# Patient Record
Sex: FEMALE | Race: WHITE | NOT HISPANIC OR LATINO | Employment: OTHER
[De-identification: names, ages, dates, MRNs, and addresses within clinical notes are randomized per-mention and may not be internally consistent; named-entity substitution may affect disease eponyms.]

---

## 2017-05-25 ENCOUNTER — RECORDS - HEALTHEAST (OUTPATIENT)
Dept: ADMINISTRATIVE | Facility: OTHER | Age: 32
End: 2017-05-25

## 2018-11-26 ENCOUNTER — OFFICE VISIT - HEALTHEAST (OUTPATIENT)
Dept: MIDWIFE SERVICES | Facility: CLINIC | Age: 33
End: 2018-11-26

## 2018-11-26 DIAGNOSIS — O20.0 THREATENED MISCARRIAGE: ICD-10-CM

## 2018-11-26 DIAGNOSIS — N96 RECURRENT PREGNANCY LOSS: ICD-10-CM

## 2018-11-26 DIAGNOSIS — E66.812 CLASS 2 OBESITY WITHOUT SERIOUS COMORBIDITY WITH BODY MASS INDEX (BMI) OF 35.0 TO 35.9 IN ADULT, UNSPECIFIED OBESITY TYPE: ICD-10-CM

## 2018-11-26 RX ORDER — ALBUTEROL SULFATE 90 UG/1
2 AEROSOL, METERED RESPIRATORY (INHALATION)
Status: SHIPPED | COMMUNITY
Start: 2018-11-26

## 2018-11-26 RX ORDER — LEVOTHYROXINE SODIUM 13 UG/1
CAPSULE ORAL
Status: SHIPPED | COMMUNITY
Start: 2017-05-31

## 2018-11-26 ASSESSMENT — MIFFLIN-ST. JEOR: SCORE: 1731.68

## 2018-11-27 ENCOUNTER — COMMUNICATION - HEALTHEAST (OUTPATIENT)
Dept: MIDWIFE SERVICES | Facility: CLINIC | Age: 33
End: 2018-11-27

## 2018-11-27 ENCOUNTER — RECORDS - HEALTHEAST (OUTPATIENT)
Dept: ADMINISTRATIVE | Facility: OTHER | Age: 33
End: 2018-11-27

## 2018-11-27 ENCOUNTER — AMBULATORY - HEALTHEAST (OUTPATIENT)
Dept: OBGYN | Facility: CLINIC | Age: 33
End: 2018-11-27

## 2018-11-27 ENCOUNTER — AMBULATORY - HEALTHEAST (OUTPATIENT)
Dept: LAB | Facility: HOSPITAL | Age: 33
End: 2018-11-27

## 2018-11-27 ENCOUNTER — COMMUNICATION - HEALTHEAST (OUTPATIENT)
Dept: ADMINISTRATIVE | Facility: CLINIC | Age: 33
End: 2018-11-27

## 2018-11-27 DIAGNOSIS — N93.9 VAGINAL BLEEDING: ICD-10-CM

## 2018-11-27 DIAGNOSIS — E66.812 CLASS 2 OBESITY WITHOUT SERIOUS COMORBIDITY WITH BODY MASS INDEX (BMI) OF 35.0 TO 35.9 IN ADULT, UNSPECIFIED OBESITY TYPE: ICD-10-CM

## 2018-11-27 DIAGNOSIS — O20.0 THREATENED MISCARRIAGE: ICD-10-CM

## 2018-11-27 DIAGNOSIS — N96 RECURRENT PREGNANCY LOSS: ICD-10-CM

## 2018-11-27 LAB
ABO/RH(D): NORMAL
ABORH REPEAT: NORMAL
PROGEST SERPL-MCNC: 0.2 NG/ML
TSH SERPL DL<=0.005 MIU/L-ACNC: 1.87 UIU/ML (ref 0.3–5)

## 2018-11-28 ENCOUNTER — COMMUNICATION - HEALTHEAST (OUTPATIENT)
Dept: OBGYN | Facility: CLINIC | Age: 33
End: 2018-11-28

## 2018-11-28 LAB — HBA1C MFR BLD: 5 % (ref 4.2–6.1)

## 2018-11-29 LAB
CARDIOLIPIN IGG SER IA-ACNC: <1.6 GPL-U/ML (ref 0–19.9)
CARDIOLIPIN IGM SER IA-ACNC: <0.2 MPL-U/ML (ref 0–19.9)

## 2018-11-30 ENCOUNTER — COMMUNICATION - HEALTHEAST (OUTPATIENT)
Dept: ADMINISTRATIVE | Facility: CLINIC | Age: 33
End: 2018-11-30

## 2018-11-30 LAB — LA PPP-IMP: NEGATIVE

## 2018-12-11 ENCOUNTER — OFFICE VISIT - HEALTHEAST (OUTPATIENT)
Dept: OBGYN | Facility: CLINIC | Age: 33
End: 2018-12-11

## 2018-12-11 DIAGNOSIS — N96 HISTORY OF RECURRENT MISCARRIAGES: ICD-10-CM

## 2018-12-11 ASSESSMENT — MIFFLIN-ST. JEOR: SCORE: 1767.96

## 2018-12-17 ENCOUNTER — COMMUNICATION - HEALTHEAST (OUTPATIENT)
Dept: OBGYN | Facility: CLINIC | Age: 33
End: 2018-12-17

## 2018-12-19 ENCOUNTER — AMBULATORY - HEALTHEAST (OUTPATIENT)
Dept: LAB | Facility: CLINIC | Age: 33
End: 2018-12-19

## 2018-12-19 ENCOUNTER — COMMUNICATION - HEALTHEAST (OUTPATIENT)
Dept: SCHEDULING | Facility: CLINIC | Age: 33
End: 2018-12-19

## 2018-12-19 DIAGNOSIS — N96 HISTORY OF RECURRENT MISCARRIAGES: ICD-10-CM

## 2018-12-19 LAB
ESTRADIOL SERPL-MCNC: 20 PG/ML
FSH SERPL-ACNC: 3.9 MIU/ML

## 2018-12-24 ENCOUNTER — HOSPITAL ENCOUNTER (OUTPATIENT)
Dept: RADIOLOGY | Facility: HOSPITAL | Age: 33
Discharge: HOME | End: 2018-12-24
Attending: OBSTETRICS & GYNECOLOGY | Admitting: RADIOLOGY

## 2018-12-24 DIAGNOSIS — N96 HISTORY OF RECURRENT MISCARRIAGES: ICD-10-CM

## 2019-01-08 ENCOUNTER — COMMUNICATION - HEALTHEAST (OUTPATIENT)
Dept: OBGYN | Facility: CLINIC | Age: 34
End: 2019-01-08

## 2019-03-26 ENCOUNTER — AMBULATORY - HEALTHEAST (OUTPATIENT)
Dept: OBGYN | Facility: CLINIC | Age: 34
End: 2019-03-26

## 2019-03-26 ENCOUNTER — COMMUNICATION - HEALTHEAST (OUTPATIENT)
Dept: OBGYN | Facility: CLINIC | Age: 34
End: 2019-03-26

## 2019-03-26 DIAGNOSIS — N96 HISTORY OF RECURRENT MISCARRIAGES: ICD-10-CM

## 2019-03-27 ENCOUNTER — AMBULATORY - HEALTHEAST (OUTPATIENT)
Dept: OBGYN | Facility: CLINIC | Age: 34
End: 2019-03-27

## 2019-03-27 ENCOUNTER — AMBULATORY - HEALTHEAST (OUTPATIENT)
Dept: LAB | Facility: CLINIC | Age: 34
End: 2019-03-27

## 2019-03-27 DIAGNOSIS — N96 HISTORY OF RECURRENT MISCARRIAGES: ICD-10-CM

## 2019-03-27 LAB
HCG UR QL: NEGATIVE
PROGEST SERPL-MCNC: 6.9 NG/ML

## 2019-04-04 ENCOUNTER — AMBULATORY - HEALTHEAST (OUTPATIENT)
Dept: OBGYN | Facility: CLINIC | Age: 34
End: 2019-04-04

## 2019-04-04 DIAGNOSIS — N96 HISTORY OF RECURRENT MISCARRIAGES: ICD-10-CM

## 2019-07-18 ENCOUNTER — COMMUNICATION - HEALTHEAST (OUTPATIENT)
Dept: ADMINISTRATIVE | Facility: CLINIC | Age: 34
End: 2019-07-18

## 2019-11-18 ENCOUNTER — COMMUNICATION - HEALTHEAST (OUTPATIENT)
Dept: MIDWIFE SERVICES | Facility: CLINIC | Age: 34
End: 2019-11-18

## 2021-05-30 NOTE — TELEPHONE ENCOUNTER
Called Cary back with instructions to make her next appt with  either [place] for reproductive medicine or  Reproductive medicine. Numbers given.  Instructed to check with her insurance first then make an appt.  Call or email back so we can fax over her records. Cary states she understands instructions.

## 2021-05-30 NOTE — TELEPHONE ENCOUNTER
Name of caller: Patient  Phone number where you may be reached: [phone]  Reason for call: Pt took clomid for 4 months but it's not working and she would like to speak with Dr. Betts to discuss symptoms that she's having next steps.   Best time to call back: any  If we don't reach you, is it okay to leave a detailed message? yes

## 2021-06-02 VITALS — BODY MASS INDEX: 39.49 KG/M2 | WEIGHT: 237 LBS | HEIGHT: 65 IN

## 2021-06-02 VITALS — HEIGHT: 65 IN | WEIGHT: 229 LBS | BODY MASS INDEX: 38.15 KG/M2

## 2021-06-16 PROBLEM — D27.0 DERMOID CYST OF RIGHT OVARY: Status: ACTIVE | Noted: 2018-11-28

## 2021-06-16 PROBLEM — D25.9 UTERINE FIBROID: Status: ACTIVE | Noted: 2018-11-28

## 2021-06-16 PROBLEM — N96 RECURRENT PREGNANCY LOSS: Status: ACTIVE | Noted: 2018-11-28

## 2021-06-21 NOTE — PROGRESS NOTES
Cary Barrett  19  [id number]        Assessment:   1.  Recurrent pregnancy loss  2.  Threatened/incomplete   3.  Obesity  4.  S/P thyroid CA      Plan:  Discussed with patient that up to about 60% of early pregnancy losses due to chromosomal abnormalities, discussed that should another loss occur could consider retaining the products of conception for genetic testing.  Discussed that sometimes it is due to an anatomic reason and thus further ultrasound such as HSG may be recommended.  I recommend that she follows up with Dr. Sherrie Betts for consultation.  We also discussed some other factors that could lead to recurrent loss and I did offer some lab testing today including lupus anticoagulant and anti-cardiolipin IgG and IgM.  I also recommend a thyroid screen and hemoglobin A1c due to patient weight.  I did not recommend thrombophilia testing today, however I did encourage the patient to request from her mother the name of the possible thrombophilia or clotting disorder that she has.  Patient has less insurance coverage so she would like to talk to her insurance as well as patient cost through [facility] to discuss what cost of lab testing would be, so lab testing was ordered in future.  We did discuss that certain labs such as the anticoagulant and anticardiolipin should be retested, and I recommend that she follows up with Dr. Sherrie Betts for consultation and future testing.  Blood typing also recommended.  Ultrasound was ordered for tomorrow at 12:30 PM, this was not ordered as hold and call due to high suspicion of loss, would recommend beta hCG testing as needed based on ultrasound.        GEN Pepe, [facility]  Total time: 40 minutes     Subjective:  Cary Barrett presents today to discuss recurrent pregnancy loss and current miscarriage.  Patient shares that her cycles are every 25 days and last 1-5 days, menarche started at 16 years of age.  Patient  "states that he had a positive pregnancy test on 11/20 and began having light bleeding on Friday that increased to heavy bleeding on Saturday with severe cramping and believes that she passed tissue over the weekend.  Patient states that she is having moderate bleeding today, no cramping and no signs of infection.  Patient shares that she had a miscarriage in November 2016 as well as September 2017, never had ultrasounds to confirm these pregnancies.  Patient shares that she has positive blood type.     Patient has a history of thyroid cancer diagnosed at age 6 and she received radiation from 1990 2/2/2007, patient also has history of fibroids and had removal through embolization in 2017.  No history of PID or STIs.   Patient's partner has hypertension and is on various medications.  Patient reports minimal alcohol use for both herself and her partner, no tobacco use for herself or her partner, and no illicit drug use for herself or for her partner.    Reports minimal caffeine intake.  Patient shares that her mother has a clotting disorder that has led to anemia but she is unsure what this is, patient shows that she was told to avoid estrogen and contraception due to her mother's clotting history.  Patient's partner does not have other children.    Review of systems: Vaginal bleeding.    Objective:   /84 (Patient Site: Left Arm, Patient Position: Sitting, Cuff Size: Adult Regular)   Pulse 72   Ht 5' 4.5\" (1.638 m)   Wt (!) 229 lb (103.9 kg)   LMP 10/25/2018 (Exact Date)   Breastfeeding? No   BMI 38.70 kg/m    Well nourished, no acute distress.     Past Medical History:   Diagnosis Date     Allergic      Female infertility      Fibroid      History of transfusion      Kidney stone      Meningitis 2012     Migraine      Thyroid ca (H)     radiation from age [age]     Past Surgical History:   Procedure Laterality Date     RADICAL NECK DISSECTION       THYROIDECTOMY       UTERINE FIBROID EMBOLIZATION      2017 "       OB History      Para Term  AB Living    3       3      SAB TAB Ectopic Multiple Live Births    3                  Family History   Problem Relation Age of Onset     Cancer Mother      Clotting disorder Mother      Asthma Mother      Hypertension Father      Diabetes Father      No Medical Problems Sister

## 2021-06-21 NOTE — PROGRESS NOTES
Patient called and asked for a progesterone lab to be added to her blood draw from today.  Lab ordered as requested.

## 2021-06-22 NOTE — PROGRESS NOTES
CC: The patient is being seen as a consult from Sherrie Watkins CNM, secondary to recurrent miscarriages.    HPI: The pt is a 33 y.o. MWF  who presents with 3 miscarriages in the last 2 years.  The first was in Nov of 2016, then Sept of 2017, then Nov 2018.  With all of them she was about 5-6 pregnant.  Her mother had 4 miscarriages, and her maternal grandmother had 3.  Her mother also has a history of some kind of clotting disorder that made her anemic, and she was told to not use estrogens.  She doesn't think any further testing was done; she was told not to use estrogen containing medications as well, but she hasn't been tested herself.  She has a history of a uterine artery embolization in 2017, with 2 of the miscarriages happening after that.  She had an ultrasound 11/27/18 that showed two intramural fibroids, one 2.2 cm in the anterior fundus and one 2.2 cm in the posterior fundus.  The endometrium was 5 mm.  The left ovary appeared normal; the right had a 3.1 x 2.8 x 2.7 cm dermoid.  She had normal testing for TSH, lupus anticoagulant, and anti cardiolipin antibody.  Her progesterone level was low but was done shortly after the miscarriage.  She has not had a period since the most recent miscarriage.    Past Medical History:   Diagnosis Date     Allergic      Female infertility      Fibroid      History of transfusion      Kidney stone      Meningitis 2012     Migraine      Thyroid ca (H)     radiation from age [age]       Past Surgical History:   Procedure Laterality Date     RADICAL NECK DISSECTION  1993     THYROIDECTOMY  1992     UTERINE ARTERY EMBOLIZATION  2017    fibroids       Patient's   Family History   Problem Relation Age of Onset     Cancer Mother         cervical 2018, vaginal 2018, uterine 2015     Clotting disorder Mother      Asthma Mother      Hypertension Father      Diabetes Father      No Medical Problems Sister      Breast cancer Paternal Aunt      Breast cancer Paternal  "Grandmother      Cancer Paternal Grandmother         lymphoma     Diabetes Paternal Grandfather      Heart disease Paternal Grandfather        Patient   Social History     Socioeconomic History     Marital status:      Spouse name: None     Number of children: None     Years of education: None     Highest education level: None   Social Needs     Financial resource strain: None     Food insecurity - worry: None     Food insecurity - inability: None     Transportation needs - medical: None     Transportation needs - non-medical: None   Occupational History     None   Tobacco Use     Smoking status: Never Smoker     Smokeless tobacco: Never Used   Substance and Sexual Activity     Alcohol use: No     Frequency: Never     Drug use: No     Sexual activity: Yes     Partners: Male     Birth control/protection: None   Other Topics Concern     None   Social History Narrative     None       Outpatient Medications Prior to Visit   Medication Sig Dispense Refill     albuterol (PROVENTIL HFA) 90 mcg/actuation inhaler Inhale 2 puffs.       levothyroxine (TIROSINT) 150 mcg cap TAKE 1 CAPSULE (150 MCG) BY ORAL ROUTE ONCE DAILY       No facility-administered medications prior to visit.        Patient is allergic to cat hair standardized allergenic extract; hydrocodone-acetaminophen; ketorolac tromethamine; ondansetron; and oxycodone-acetaminophen.    ROS:  12 part ROS is negative aside from those symptoms in the HPI    PE:  /78   Pulse 88   Ht 5' 4.5\" (1.638 m)   Wt (!) 237 lb (107.5 kg)           Body mass index is 40.05 kg/m .    General: obese WF, NAD  Psych: normal mood  Neuro: CN I-XII grossly intact  MS: normal gait    Assessment: 33 y.o. MWF  with recurrent miscarriages.    Plan: Natural history of causes for miscarriages discussed with the patient.  We discussed that with the embolization I would recommend a hysterosalpingogram.  We also discussed progesterone testing in the luteal phase; we discussed " why the level looked so low with her miscarriage.  We discussed testing for clotting factors, especially with her family history.  We discussed that the clotting factors shouldn't be tested until she is at least 6 weeks out from the miscarriage.  We discussed ovulation testing so we can get an accurate progesterone.  We discussed day 3 testing for FSH and estradiol.  We also discussed that if all the testing is normal, I would recommend karyotypes for her and her .  Questions were answered.    Approximately 30 minutes were spent with the patient with the majority in counseling.

## 2021-06-26 ENCOUNTER — HEALTH MAINTENANCE LETTER (OUTPATIENT)
Age: 36
End: 2021-06-26

## 2021-09-09 DIAGNOSIS — G47.33 OSA (OBSTRUCTIVE SLEEP APNEA): Primary | ICD-10-CM

## 2021-10-16 ENCOUNTER — HEALTH MAINTENANCE LETTER (OUTPATIENT)
Age: 36
End: 2021-10-16

## 2021-11-11 ENCOUNTER — ANCILLARY PROCEDURE (OUTPATIENT)
Dept: GENERAL RADIOLOGY | Facility: CLINIC | Age: 36
End: 2021-11-11
Attending: CHIROPRACTOR
Payer: COMMERCIAL

## 2021-11-11 DIAGNOSIS — M53.3 PAIN OF BOTH SACROILIAC JOINTS: ICD-10-CM

## 2021-11-11 DIAGNOSIS — M54.50 LOW BACK PAIN: ICD-10-CM

## 2021-11-11 PROCEDURE — 72100 X-RAY EXAM L-S SPINE 2/3 VWS: CPT | Performed by: RADIOLOGY

## 2022-07-23 ENCOUNTER — HEALTH MAINTENANCE LETTER (OUTPATIENT)
Age: 37
End: 2022-07-23

## 2022-10-01 ENCOUNTER — HEALTH MAINTENANCE LETTER (OUTPATIENT)
Age: 37
End: 2022-10-01

## 2023-02-02 ENCOUNTER — APPOINTMENT (OUTPATIENT)
Dept: ULTRASOUND IMAGING | Facility: CLINIC | Age: 38
End: 2023-02-02
Attending: EMERGENCY MEDICINE
Payer: COMMERCIAL

## 2023-02-02 ENCOUNTER — APPOINTMENT (OUTPATIENT)
Dept: CT IMAGING | Facility: CLINIC | Age: 38
End: 2023-02-02
Attending: EMERGENCY MEDICINE
Payer: COMMERCIAL

## 2023-02-02 ENCOUNTER — HOSPITAL ENCOUNTER (EMERGENCY)
Facility: CLINIC | Age: 38
Discharge: HOME | End: 2023-02-02
Attending: EMERGENCY MEDICINE | Admitting: EMERGENCY MEDICINE
Payer: COMMERCIAL

## 2023-02-02 VITALS
OXYGEN SATURATION: 100 % | SYSTOLIC BLOOD PRESSURE: 159 MMHG | RESPIRATION RATE: 17 BRPM | DIASTOLIC BLOOD PRESSURE: 99 MMHG | HEART RATE: 76 BPM | TEMPERATURE: 97.6 F

## 2023-02-02 DIAGNOSIS — D25.9 UTERINE LEIOMYOMA, UNSPECIFIED LOCATION: ICD-10-CM

## 2023-02-02 DIAGNOSIS — D27.0 DERMOID CYST OF RIGHT OVARY: ICD-10-CM

## 2023-02-02 DIAGNOSIS — R10.32 BILATERAL LOWER ABDOMINAL CRAMPING: ICD-10-CM

## 2023-02-02 DIAGNOSIS — R10.2 SUPRAPUBIC ABDOMINAL PAIN: ICD-10-CM

## 2023-02-02 DIAGNOSIS — R10.31 BILATERAL LOWER ABDOMINAL CRAMPING: ICD-10-CM

## 2023-02-02 LAB
ALBUMIN SERPL BCG-MCNC: 4.3 G/DL (ref 3.5–5.2)
ALBUMIN UR-MCNC: NEGATIVE MG/DL
ALP SERPL-CCNC: 69 U/L (ref 35–104)
ALT SERPL W P-5'-P-CCNC: 18 U/L (ref 10–35)
ANION GAP SERPL CALCULATED.3IONS-SCNC: 9 MMOL/L (ref 7–15)
APPEARANCE UR: CLEAR
AST SERPL W P-5'-P-CCNC: 19 U/L (ref 10–35)
BACTERIA #/AREA URNS HPF: ABNORMAL /HPF
BASOPHILS # BLD AUTO: 0 10E3/UL (ref 0–0.2)
BASOPHILS NFR BLD AUTO: 0 %
BILIRUB SERPL-MCNC: 0.4 MG/DL
BILIRUB UR QL STRIP: NEGATIVE
BUN SERPL-MCNC: 17 MG/DL (ref 6–20)
CALCIUM SERPL-MCNC: 9.7 MG/DL (ref 8.6–10)
CHLORIDE SERPL-SCNC: 105 MMOL/L (ref 98–107)
COLOR UR AUTO: ABNORMAL
CREAT SERPL-MCNC: 0.82 MG/DL (ref 0.51–0.95)
DEPRECATED HCO3 PLAS-SCNC: 22 MMOL/L (ref 22–29)
EOSINOPHIL # BLD AUTO: 0.1 10E3/UL (ref 0–0.7)
EOSINOPHIL NFR BLD AUTO: 1 %
ERYTHROCYTE [DISTWIDTH] IN BLOOD BY AUTOMATED COUNT: 12.8 % (ref 10–15)
GFR SERPL CREATININE-BSD FRML MDRD: >90 ML/MIN/1.73M2
GLUCOSE SERPL-MCNC: 113 MG/DL (ref 70–99)
GLUCOSE UR STRIP-MCNC: NEGATIVE MG/DL
HCG SERPL QL: NEGATIVE
HCT VFR BLD AUTO: 43.1 % (ref 35–47)
HGB BLD-MCNC: 14.5 G/DL (ref 11.7–15.7)
HGB UR QL STRIP: NEGATIVE
IMM GRANULOCYTES # BLD: 0 10E3/UL
IMM GRANULOCYTES NFR BLD: 0 %
KETONES UR STRIP-MCNC: 40 MG/DL
LEUKOCYTE ESTERASE UR QL STRIP: NEGATIVE
LIPASE SERPL-CCNC: 31 U/L (ref 13–60)
LYMPHOCYTES # BLD AUTO: 1.3 10E3/UL (ref 0.8–5.3)
LYMPHOCYTES NFR BLD AUTO: 12 %
MCH RBC QN AUTO: 28.1 PG (ref 26.5–33)
MCHC RBC AUTO-ENTMCNC: 33.6 G/DL (ref 31.5–36.5)
MCV RBC AUTO: 84 FL (ref 78–100)
MONOCYTES # BLD AUTO: 0.3 10E3/UL (ref 0–1.3)
MONOCYTES NFR BLD AUTO: 3 %
MUCOUS THREADS #/AREA URNS LPF: PRESENT /LPF
NEUTROPHILS # BLD AUTO: 9 10E3/UL (ref 1.6–8.3)
NEUTROPHILS NFR BLD AUTO: 84 %
NITRATE UR QL: NEGATIVE
NRBC # BLD AUTO: 0 10E3/UL
NRBC BLD AUTO-RTO: 0 /100
PH UR STRIP: 6 [PH] (ref 5–7)
PLATELET # BLD AUTO: 450 10E3/UL (ref 150–450)
POTASSIUM SERPL-SCNC: 4.1 MMOL/L (ref 3.4–5.3)
PROT SERPL-MCNC: 7.5 G/DL (ref 6.4–8.3)
RBC # BLD AUTO: 5.16 10E6/UL (ref 3.8–5.2)
RBC URINE: 2 /HPF
SODIUM SERPL-SCNC: 136 MMOL/L (ref 136–145)
SP GR UR STRIP: 1.01 (ref 1–1.03)
SQUAMOUS EPITHELIAL: <1 /HPF
UROBILINOGEN UR STRIP-MCNC: NORMAL MG/DL
WBC # BLD AUTO: 10.7 10E3/UL (ref 4–11)
WBC URINE: 2 /HPF

## 2023-02-02 PROCEDURE — 250N000013 HC RX MED GY IP 250 OP 250 PS 637: Performed by: EMERGENCY MEDICINE

## 2023-02-02 PROCEDURE — 96376 TX/PRO/DX INJ SAME DRUG ADON: CPT

## 2023-02-02 PROCEDURE — 85025 COMPLETE CBC W/AUTO DIFF WBC: CPT | Performed by: EMERGENCY MEDICINE

## 2023-02-02 PROCEDURE — 96375 TX/PRO/DX INJ NEW DRUG ADDON: CPT

## 2023-02-02 PROCEDURE — 99285 EMERGENCY DEPT VISIT HI MDM: CPT | Mod: 25

## 2023-02-02 PROCEDURE — 250N000009 HC RX 250: Performed by: EMERGENCY MEDICINE

## 2023-02-02 PROCEDURE — 96374 THER/PROPH/DIAG INJ IV PUSH: CPT | Mod: 59

## 2023-02-02 PROCEDURE — 96361 HYDRATE IV INFUSION ADD-ON: CPT

## 2023-02-02 PROCEDURE — 250N000011 HC RX IP 250 OP 636: Performed by: EMERGENCY MEDICINE

## 2023-02-02 PROCEDURE — 84703 CHORIONIC GONADOTROPIN ASSAY: CPT | Performed by: EMERGENCY MEDICINE

## 2023-02-02 PROCEDURE — 76830 TRANSVAGINAL US NON-OB: CPT

## 2023-02-02 PROCEDURE — 83690 ASSAY OF LIPASE: CPT | Performed by: EMERGENCY MEDICINE

## 2023-02-02 PROCEDURE — 36415 COLL VENOUS BLD VENIPUNCTURE: CPT | Performed by: EMERGENCY MEDICINE

## 2023-02-02 PROCEDURE — 74177 CT ABD & PELVIS W/CONTRAST: CPT

## 2023-02-02 PROCEDURE — 81003 URINALYSIS AUTO W/O SCOPE: CPT | Performed by: EMERGENCY MEDICINE

## 2023-02-02 PROCEDURE — 258N000003 HC RX IP 258 OP 636: Performed by: EMERGENCY MEDICINE

## 2023-02-02 PROCEDURE — 80053 COMPREHEN METABOLIC PANEL: CPT | Performed by: EMERGENCY MEDICINE

## 2023-02-02 RX ORDER — HYDROMORPHONE HYDROCHLORIDE 2 MG/1
2 TABLET ORAL EVERY 6 HOURS PRN
Qty: 10 TABLET | Refills: 0 | Status: SHIPPED | OUTPATIENT
Start: 2023-02-02 | End: 2023-02-05

## 2023-02-02 RX ORDER — HYDROMORPHONE HYDROCHLORIDE 2 MG/1
2 TABLET ORAL ONCE
Status: COMPLETED | OUTPATIENT
Start: 2023-02-02 | End: 2023-02-02

## 2023-02-02 RX ORDER — OXYCODONE AND ACETAMINOPHEN 5; 325 MG/1; MG/1
2 TABLET ORAL ONCE
Status: COMPLETED | OUTPATIENT
Start: 2023-02-02 | End: 2023-02-02

## 2023-02-02 RX ORDER — ONDANSETRON 2 MG/ML
4 INJECTION INTRAMUSCULAR; INTRAVENOUS ONCE
Status: COMPLETED | OUTPATIENT
Start: 2023-02-02 | End: 2023-02-02

## 2023-02-02 RX ORDER — HYDROMORPHONE HYDROCHLORIDE 1 MG/ML
0.5 INJECTION, SOLUTION INTRAMUSCULAR; INTRAVENOUS; SUBCUTANEOUS
Status: DISCONTINUED | OUTPATIENT
Start: 2023-02-02 | End: 2023-02-02

## 2023-02-02 RX ORDER — IOPAMIDOL 755 MG/ML
120 INJECTION, SOLUTION INTRAVASCULAR ONCE
Status: COMPLETED | OUTPATIENT
Start: 2023-02-02 | End: 2023-02-02

## 2023-02-02 RX ORDER — ONDANSETRON 2 MG/ML
4 INJECTION INTRAMUSCULAR; INTRAVENOUS
Status: DISCONTINUED | OUTPATIENT
Start: 2023-02-02 | End: 2023-02-02 | Stop reason: HOSPADM

## 2023-02-02 RX ADMIN — SODIUM CHLORIDE 74 ML: 9 INJECTION, SOLUTION INTRAVENOUS at 13:48

## 2023-02-02 RX ADMIN — IOPAMIDOL 120 ML: 755 INJECTION, SOLUTION INTRAVENOUS at 13:48

## 2023-02-02 RX ADMIN — HYDROMORPHONE HYDROCHLORIDE 0.5 MG: 1 INJECTION, SOLUTION INTRAMUSCULAR; INTRAVENOUS; SUBCUTANEOUS at 14:28

## 2023-02-02 RX ADMIN — SODIUM CHLORIDE 1000 ML: 9 INJECTION, SOLUTION INTRAVENOUS at 17:03

## 2023-02-02 RX ADMIN — OXYCODONE HYDROCHLORIDE AND ACETAMINOPHEN 2 TABLET: 5; 325 TABLET ORAL at 17:04

## 2023-02-02 RX ADMIN — SODIUM CHLORIDE 1000 ML: 9 INJECTION, SOLUTION INTRAVENOUS at 12:38

## 2023-02-02 RX ADMIN — HYDROMORPHONE HYDROCHLORIDE 2 MG: 2 TABLET ORAL at 19:56

## 2023-02-02 RX ADMIN — ONDANSETRON 4 MG: 2 INJECTION INTRAMUSCULAR; INTRAVENOUS at 17:04

## 2023-02-02 RX ADMIN — ONDANSETRON 4 MG: 2 INJECTION INTRAMUSCULAR; INTRAVENOUS at 19:53

## 2023-02-02 RX ADMIN — ONDANSETRON 4 MG: 2 INJECTION INTRAMUSCULAR; INTRAVENOUS at 12:35

## 2023-02-02 ASSESSMENT — ACTIVITIES OF DAILY LIVING (ADL)
ADLS_ACUITY_SCORE: 35
ADLS_ACUITY_SCORE: 33

## 2023-02-02 NOTE — ED TRIAGE NOTES
Reports prepping with cytotech for procedure at [facility]. Did not have procedure - [facility] sent to ED. Since 0300 severe abdominal cramps, nausea, emesis x3. Denies bloody emesis.

## 2023-02-02 NOTE — ED PROVIDER NOTES
History     Chief Complaint:  Abdominal Cramping       HPI   Cary Barrett is a 37 year old female with a history of uterine fibroids and ovarian cyst who presents with abdominal pain. The patient reports that she was prepping with cytotec for a hysteroscopy yesterday. She states then this morning at 0300 she had onset of severe abdominal pain, nausea, and vomiting. She endorses chills as well. The patient denies any vaginal bleeding, diarrhea, and fever. The patient was getting the hysteroscopy for menorrhagia and past miscarriages. She states she also has a history of uterine fibroids and ovarian cysts. She has required a uterine fibroid ablation in the past. The patient notes she was given Toradol earlier in clinic. She follows with [facility] for OB/Gyn care.     ROS:  Review of Systems   ROS: 10 point ROS neg other than the symptoms noted above in the HPI.     Allergies:  Hydrocodone-Acetaminophen   Ketorolac Tromethamine   Ondansetron     Medications:    Albuterol   Levothyroxine     Past Medical History:    Uterine fibroids   Ovarian cyst    Past Surgical History:    Radial neck dissection   Thyroidectomy   Uterine artery embolization     Family History:    family history includes Asthma in her mother; Breast Cancer in her paternal aunt and paternal grandmother; Cancer in her mother and paternal grandmother; Clotting Disorder in her mother; Diabetes in her father and paternal grandfather; Heart Disease in her paternal grandfather; Hypertension in her father; No Known Problems in her sister.    Social History:  The patient presents with her .    reports that she has never smoked. She has never used smokeless tobacco. She reports that she does not drink alcohol and does not use drugs.    Physical Exam     Patient Vitals for the past 24 hrs:   BP Temp Temp src Pulse Resp SpO2   02/02/23 1214 (!) 159/99 97.6  F (36.4  C) Temporal 76 17 100 %      Physical Exam  General: Alert, appears well-developed and  well-nourished. Cooperative.     In mild distress  HEENT:  Head:  Atraumatic  Ears:  External ears are normal  Mouth/Throat:  Oropharynx is without erythema or exudate and mucous membranes are dry.   Eyes:   Conjunctivae normal and EOM are normal. No scleral icterus.  CV:  Normal rate, regular rhythm, normal heart sounds and radial pulses are 2+ and symmetric.  No murmur.  Resp:  Breath sounds are clear bilaterally    Non-labored, no retractions or accessory muscle use  GI:  Obese. Abdomen is soft, no distension, RLQ, midline suprapubic and LLQ tenderness. No rebound or guarding.  No CVA tenderness bilaterally  MS:  Normal range of motion. No edema.    Normal strength in all 4 extremities.     Back atraumatic.    No midline cervical, thoracic, or lumbar tenderness  Skin:  Warm and dry.  No rash or lesions noted.  Neuro: Alert. Normal strength.  GCS: 15  Psych:  Normal mood and affect.    Emergency Department Course     Imaging:  CT Abdomen Pelvis w Contrast   Final Result   IMPRESSION:    1.  Hypodensities in the uterus may be fibroids. One of these is   rounded at the distal anterior uterus. A small fluid collection is   also possible that could be further evaluated with pelvic ultrasound.   2.  Right ovarian dermoid is approximately 4.1 cm.   3.  No other acute abnormality.      LUCA BURTON MD            SYSTEM ID:  [id number]      US Pelvis Cmplt w Transvag & Doppler LmtPel Duplex Limited    (Results Pending)      Report per radiology    Laboratory:  Labs Ordered and Resulted from Time of ED Arrival to Time of ED Departure   COMPREHENSIVE METABOLIC PANEL - Abnormal       Result Value    Sodium 136      Potassium 4.1      Chloride 105      Carbon Dioxide (CO2) 22      Anion Gap 9      Urea Nitrogen 17.0      Creatinine 0.82      Calcium 9.7      Glucose 113 (*)     Alkaline Phosphatase 69      AST 19      ALT 18      Protein Total 7.5      Albumin 4.3      Bilirubin Total 0.4      GFR Estimate >90     ROUTINE UA  WITH MICROSCOPIC REFLEX TO CULTURE - Abnormal    Color Urine Light Yellow      Appearance Urine Clear      Glucose Urine Negative      Bilirubin Urine Negative      Ketones Urine 40 (*)     Specific Gravity Urine 1.010      Blood Urine Negative      pH Urine 6.0      Protein Albumin Urine Negative      Urobilinogen Urine Normal      Nitrite Urine Negative      Leukocyte Esterase Urine Negative      Bacteria Urine Few (*)     Mucus Urine Present (*)     RBC Urine 2      WBC Urine 2      Squamous Epithelials Urine <1     CBC WITH PLATELETS AND DIFFERENTIAL - Abnormal    WBC Count 10.7      RBC Count 5.16      Hemoglobin 14.5      Hematocrit 43.1      MCV 84      MCH 28.1      MCHC 33.6      RDW 12.8      Platelet Count 450      % Neutrophils 84      % Lymphocytes 12      % Monocytes 3      % Eosinophils 1      % Basophils 0      % Immature Granulocytes 0      NRBCs per 100 WBC 0      Absolute Neutrophils 9.0 (*)     Absolute Lymphocytes 1.3      Absolute Monocytes 0.3      Absolute Eosinophils 0.1      Absolute Basophils 0.0      Absolute Immature Granulocytes 0.0      Absolute NRBCs 0.0     LIPASE - Normal    Lipase 31     HCG QUALITATIVE PREGNANCY - Normal    hCG Serum Qualitative Negative          Emergency Department Course & Assessments:    Interventions:  Medications   HYDROmorphone (PF) (DILAUDID) injection 0.5 mg (0.5 mg Intravenous Given 2/2/23 1428)   0.9% sodium chloride BOLUS (0 mLs Intravenous Stopped 2/2/23 1510)   ondansetron (ZOFRAN) injection 4 mg (4 mg Intravenous Given 2/2/23 1235)   iopamidol (ISOVUE-370) solution 120 mL (120 mLs Intravenous Given 2/2/23 1348)   Saline flush (74 mLs Intravenous Given 2/2/23 1348)   oxyCODONE-acetaminophen (PERCOCET) 5-325 MG per tablet 2 tablet (2 tablets Oral Given 2/2/23 1704)   0.9% sodium chloride BOLUS (1,000 mLs Intravenous New Bag 2/2/23 1703)   ondansetron (ZOFRAN) injection 4 mg (4 mg Intravenous Given 2/2/23 1704)        Consultations/Discussion of  Management or Tests:  1426  I spoke with Dr. Morales of [facility] regarding patient's presentation, findings, and plan of care.  The patient recently had an ultrasound 1/26. She states at this time the patient did not have the 4.1 cm dermoid.     1618 I spoke with Dr. Morales. She would like an update after oral pain medications are trialed.     1726 I updated Dr. Morales that the patient was having nausea so she took Zofran prior to pain medication and pain cannot be properly assessed yet for discharge. She states that she re-reviewed the ultrasound done at her clinic this past week and states that she is concerned for possible torsion. She recommends if the patient's pain is not controlled to follow up with pelvic ultrasound.     Assessments:  ED Course as of 02/02/23 1910   Thu Feb 02, 2023 1213 I obtained history and examined the patient as noted above.    1427 I returned to check on patient.      Disposition:  Care of the patient was transferred to my colleague Dr. Robles pending US and re-evaluation regarding pain control.     Impression & Plan      Medical Decision Making:  Patient is a 37-year-old female with a history of uterine fibroids who presents with suprapubic abdominal pain.  Patient was prepping for a hysteroscopically with [facility] and was given a prescription for Cytotec which she had placed last night.  Unfortunately she has severe lower abdominal discomfort today and associated nausea and vomiting.  The Cytotec was removed early this morning the patient is continued to have abdominal pain and vomiting.  She initially arrived with a tender abdomen to the lower left quadrant, right lower quadrant, and midline suprapubic regions.  We did initiate with laboratory work, interventions with Zofran, Toradol, and IV fluids and a CT scan of the abdomen pelvis to evaluate for potential sinister intra-abdominal pathologies.  Thankfully CT imaging did not reveal any evidence of bowel  obstruction, appendicitis, intra-abdominal abscess or other sinister acute surgical abnormalities.  There was a noted right dermoid cyst of the suspected right ovary.  There were significant fibroids noted within the uterus as well.  I spoke with Dr. Floyd with [facility] and we discussed prior ultrasound imaging in clinic in the last 1 week as well as what could be the etiology of her ongoing abdominal pain here today.  With CT imaging reassuring Dr. Floyd had recommended ultrasound of the pelvis to ensure no evidence of torsion associated with the right dermoid cyst.  Patient continues to have ongoing symptoms and has required multiple doses of IV pain medications.  Ultrasound of the pelvis is pending at time of signout and final disposition pending as well based on results of pelvic ultrasound.  Assuming no evidence of torsion, if patient's pain is well controlled would be able to discharge home with oral pain medication and follow-up with [facility].  If any evidence of torsion or other sinister GYN pathology plan for rediscussion with [facility].  If symptoms or not well controlled may need to be observed overnight under the care of [facility].  Care signed out to my partner Dr. Robles pending reevaluation post ultrasound and final ultrasound results.     Diagnosis:    ICD-10-CM    1. Bilateral lower abdominal cramping  R10.31     R10.32       2. Suprapubic abdominal pain  R10.2       3. Dermoid cyst of right ovary  D27.0       4. Uterine leiomyoma, unspecified location  D25.9            Scribe Disclosure:  I, Adri Penaloza, am serving as a scribe at 2:16 PM on 2/2/2023 to document services personally performed by Akhil Hale MD based on my observations and the provider's statements to me.    2/2/2023   Akhil Hale MD White, Scott, MD  02/02/23 1911

## 2023-02-03 NOTE — ED PROVIDER NOTES
Sign Out Note    I took over care of this patient from Dr. Hale    Briefly, patient presented to the ED for: pelvic pain    Plan at time of sign out: Pelvic ultrasound pending to rule out radiographic evidence of torsion, which is not highly suspected clinically.  If this is negative, and patient's pain is controlled, plan for discharge home.  Alternatively, if refractory pain, plan for ops admission, and if radiographic evidence of torsion, contact gynecology for surgical intervention.    Events during my shift: Ultrasound has now been read by radiology:  US Pelvis Cmplt w Transvag & Doppler LmtPel Duplex Limited   Final Result   IMPRESSION:     1.  Two uterine fibroids.      2.  Right dermoid measuring 4.1 x 4.3 x 3.9 cm.      3.  Endometrial stripe measures 3.9 mm.        I spoke with the patient and her  in room 31 at 1926.  We discussed the ultrasound results.  She reports that her pain improved with the pain pill, though is slightly returning.  We discussed hospitalization versus discharge.  She and her  are both comfortable to plan for discharge home.  She has had vomiting from other opioids in the past, and agreed to try Dilaudid which I will give a dose of here by mouth prior to discharge with an electronic prescription for the same.  Close follow-up encouraged.  Return precautions reviewed and agreed upon.  She asked for another dose of antiemetic prior to administration of oral Dilaudid at the time of discharge and this was granted.    MD Margaret Vallejo Jeffrey Alan, MD  02/02/23 1942

## 2023-02-03 NOTE — DISCHARGE INSTRUCTIONS
Discharge Instructions  Abdominal Pain    Abdominal pain (belly pain) can be caused by many things. Your evaluation today does not show the exact cause for your pain. Your provider today has decided that it is unlikely your pain is due to a life threatening problem, or a problem requiring surgery or hospital admission. Sometimes those problems cannot be found right away, so it is very important that you follow up as directed.  Sometimes only the changes which occur over time allow the cause of your pain to be found.    Generally, every Emergency Department visit should have a follow-up clinic visit with either a primary or a specialty clinic/provider. Please follow-up as instructed by your emergency provider today. With abdominal pain, we often recommend very close follow-up, such as the following day.    ADULTS:  Return to the Emergency Department right away if:    You get an oral temperature above 102oF or as directed by your provider.  You have blood in your stools. This may be bright red or appear as black, tarry stools.    You keep vomiting (throwing up) or cannot drink liquids.  You see blood when you vomit.   You cannot have a bowel movement or you cannot pass gas.  Your stomach gets bloated or bigger.  Your skin or the whites of your eyes look yellow.  You faint.  You have bloody, frequent or painful urination (peeing).  You have new symptoms or anything that worries you.    CHILDREN:  Return to the Emergency Department right away if your child has any of the above-listed symptoms or the following:    Pushes your hand away or screams/cries when his/her belly is touched.  You notice your child is very fussy or weak.  Your child is very tired and is too tired to eat or drink.  Your child is dehydrated.  Signs of dehydration can be:  Significant change in the amount of wet diapers/urine.  Your infant or child starts to have dry mouth and lips, or no saliva (spit) or tears.    PREGNANT WOMEN:  Return to the  Emergency Department right away if you have any of the above-listed symptoms or the following:    You have bleeding, leaking fluid or passing tissue from the vagina.  You have worse pain or cramping, or pain in your shoulder or back.  You have vomiting that will not stop.  You have a temperature of 100oF or more.  Your baby is not moving as much as usual.  You faint.  You get a bad headache with or without eye problems and abdominal pain.  You have a seizure.  You have unusual discharge from your vagina and abdominal pain.    Abdominal pain is pretty common during pregnancy.  Your pain may or may not be related to your pregnancy. You should follow-up closely with your OB provider so they can evaluate you and your baby.  Until you follow-up with your regular provider, do the following:     Avoid sex and do not put anything in your vagina.  Drink clear fluids.  Only take medications approved by your provider.    MORE INFORMATION:    Appendicitis:  A possible cause of abdominal pain in any person who still has their appendix is acute appendicitis. Appendicitis is often hard to diagnose.  Testing does not always rule out early appendicitis or other causes of abdominal pain. Close follow-up with your provider and re-evaluations may be needed to figure out the reason for your abdominal pain.    Follow-up:  It is very important that you make an appointment with your clinic and go to the appointment.  If you do not follow-up with your primary provider, it may result in missing an important development which could result in permanent injury or disability and/or lasting pain.  If there is any problem keeping your appointment, call your provider or return to the Emergency Department.    Medications:  Take your medications as directed by your provider today.  Before using over-the-counter medications, ask your provider and make sure to take the medications as directed.  If you have any questions about medications, ask your  "provider.    Diet:  Resume your normal diet as much as possible, but do not eat fried, fatty or spicy foods while you have pain.  Do not drink alcohol or have caffeine.  Do not smoke tobacco.    Probiotics: If you have been given an antibiotic, you may want to also take a probiotic pill or eat yogurt with live cultures. Probiotics have \"good bacteria\" to help your intestines stay healthy. Studies have shown that probiotics help prevent diarrhea (loose stools) and other intestine problems (including C. diff infection) when you take antibiotics. You can buy these without a prescription in the pharmacy section of the store.     If you were given a prescription for medicine here today, be sure to read all of the information (including the package insert) that comes with your prescription.  This will include important information about the medicine, its side effects, and any warnings that you need to know about.  The pharmacist who fills the prescription can provide more information and answer questions you may have about the medicine.  If you have questions or concerns that the pharmacist cannot address, please call or return to the Emergency Department.       Remember that you can always come back to the Emergency Department if you are not able to see your regular provider in the amount of time listed above, if you get any new symptoms, or if there is anything that worries you.      Opioid Medication Information    You have been given a prescription for an opioid (narcotic) pain medicine and/or have received a pain medicine while here in the Emergency Department. These medicines can make you drowsy or impaired. You must not drive, operate dangerous equipment, or engage in any other dangerous activities while taking these medications. If you drive while taking these medications, you could be arrested for driving under the influence (DUI). Do not drink any alcohol while you are taking these medications.     Opioid pain " medications can cause addiction. If you have a history of chemical dependency of any type, you are at a higher risk of becoming addicted to pain medications.  Only take these prescribed medications to treat your pain when all other options have been tried. Take it for as short a time and as few doses as possible. Store your pain pills in a secure place, as they are frequently stolen and provide a dangerous opportunity for children or visitors in your house to start abusing these powerful medications. We will not replace any lost or stolen medicine.    If you do not finish your medication, it is a good idea to get rid of it but please do not flush it down the toilet. Please dispose of the remaining medication at a local pharmacy or law enforcement facility. The [place] [employer] has additional information on medication disposal: https://www.pca.[facility].[place]/living-green/managing-unwanted-medications.      Many prescription pain medications contain Tylenol  (acetaminophen), including Vicodin , Tylenol #3 , Norco , Lortab , and Percocet .  You should not take any extra pills of Tylenol  if you are using these prescription medications or you can get very sick.  Do not ever take more than 3000 mg of acetaminophen in any 24 hour period.    All opioids tend to cause constipation. Drink plenty of water and eat foods that have a lot of fiber, such as fruits, vegetables, prune juice, apple juice and high fiber cereal.  Take a laxative if you don t move your bowels at least every other day. Miralax , Milk of Magnesia, Colace , or Senna  can be used to keep you regular.

## 2023-02-27 PROCEDURE — 88305 TISSUE EXAM BY PATHOLOGIST: CPT | Mod: 26 | Performed by: STUDENT IN AN ORGANIZED HEALTH CARE EDUCATION/TRAINING PROGRAM

## 2023-02-27 PROCEDURE — 88305 TISSUE EXAM BY PATHOLOGIST: CPT | Mod: TC,ORL | Performed by: STUDENT IN AN ORGANIZED HEALTH CARE EDUCATION/TRAINING PROGRAM

## 2023-02-28 ENCOUNTER — LAB REQUISITION (OUTPATIENT)
Dept: LAB | Facility: CLINIC | Age: 38
End: 2023-02-28
Payer: COMMERCIAL

## 2023-03-01 LAB
PATH REPORT.COMMENTS IMP SPEC: NORMAL
PATH REPORT.COMMENTS IMP SPEC: NORMAL
PATH REPORT.FINAL DX SPEC: NORMAL
PATH REPORT.GROSS SPEC: NORMAL
PATH REPORT.MICROSCOPIC SPEC OTHER STN: NORMAL
PATH REPORT.RELEVANT HX SPEC: NORMAL
PHOTO IMAGE: NORMAL

## 2023-08-06 ENCOUNTER — HEALTH MAINTENANCE LETTER (OUTPATIENT)
Age: 38
End: 2023-08-06

## 2024-01-30 ENCOUNTER — HOSPITAL ENCOUNTER (EMERGENCY)
Facility: CLINIC | Age: 39
Discharge: LEFT BEFORE DISCHARGE | End: 2024-01-30
Admitting: EMERGENCY MEDICINE
Payer: COMMERCIAL

## 2024-01-30 ENCOUNTER — NURSE TRIAGE (OUTPATIENT)
Dept: NURSING | Facility: CLINIC | Age: 39
End: 2024-01-30
Payer: COMMERCIAL

## 2024-01-30 VITALS
OXYGEN SATURATION: 98 % | BODY MASS INDEX: 42.25 KG/M2 | HEART RATE: 135 BPM | RESPIRATION RATE: 16 BRPM | DIASTOLIC BLOOD PRESSURE: 88 MMHG | TEMPERATURE: 99.5 F | WEIGHT: 250 LBS | SYSTOLIC BLOOD PRESSURE: 129 MMHG

## 2024-01-30 LAB
FLUAV RNA SPEC QL NAA+PROBE: POSITIVE
FLUBV RNA RESP QL NAA+PROBE: NEGATIVE
RSV RNA SPEC NAA+PROBE: NEGATIVE
SARS-COV-2 RNA RESP QL NAA+PROBE: NEGATIVE

## 2024-01-30 PROCEDURE — 99281 EMR DPT VST MAYX REQ PHY/QHP: CPT

## 2024-01-30 PROCEDURE — 87637 SARSCOV2&INF A&B&RSV AMP PRB: CPT | Performed by: FAMILY MEDICINE

## 2024-01-30 PROCEDURE — 250N000013 HC RX MED GY IP 250 OP 250 PS 637: Performed by: FAMILY MEDICINE

## 2024-01-30 RX ORDER — ACETAMINOPHEN 325 MG/1
650 TABLET ORAL ONCE
Status: COMPLETED | OUTPATIENT
Start: 2024-01-30 | End: 2024-01-30

## 2024-01-30 RX ADMIN — ACETAMINOPHEN 650 MG: 325 TABLET, FILM COATED ORAL at 19:17

## 2024-01-30 NOTE — TELEPHONE ENCOUNTER
Nurse Triage SBAR    Is this a 2nd Level Triage? NO    Situation: Cough, fever 102.2, head pain. Symptoms began yesterday.     Background: Patient initially stated she returned home from [facility] two days ago.  Was treated for UTI while in [place]. Two weeks ago, was treated for sinus infection and vertigo. Currently, patient began having cough yesterday and feels short of breath at rest, temp 102.2 with shivering. Did take ibuprofen a half hour ago. Feels like it is difficult to take a deep breath. Cough is productive, but she is not sure what color sputum. Feels lightheaded when getting up. Pushing po fluids.  Has not taken covid test.     Assessment: fever, SOB, cough, difficulty taking deep breath, head pain, no covid test done.     Protocol Recommended Disposition:   Go to ED Now    Recommendation: Go to ED now. Patient will go to [place] ED. Will have either  or friend drive her there.           Does the patient meet one of the following criteria for ADS visit consideration? No  Reason for Disposition   MODERATE difficulty breathing (e.g., speaks in phrases, SOB even at rest, pulse 100-120) and still present when not coughing    Additional Information   Negative: Bluish (or gray) lips or face   Negative: SEVERE difficulty breathing (e.g., struggling for each breath, speaks in single words)   Negative: Rapid onset of cough and has hives   Negative: Coughing started suddenly after medicine, an allergic food or bee sting   Negative: Difficulty breathing after exposure to flames, smoke, or fumes   Negative: Sounds like a life-threatening emergency to the triager    Protocols used: Cough-A-OH

## 2024-01-31 NOTE — ED TRIAGE NOTES
Pt has been feeling ill for two days.  Non productive cough with fever (103).  Being treated for a UTI.     Triage Assessment (Adult)       Row Name 01/30/24 1914          Triage Assessment    Airway WDL WDL        Respiratory WDL    Respiratory WDL X;rhythm/pattern;cough     Rhythm/Pattern, Respiratory shortness of breath     Cough Frequency frequent     Cough Type bronchospastic;nonproductive        Skin Circulation/Temperature WDL    Skin Circulation/Temperature WDL X;temperature  flushed     Skin Temperature warm        Cardiac WDL    Cardiac WDL X;rhythm     Pulse Rate & Regularity tachycardic        Peripheral/Neurovascular WDL    Peripheral Neurovascular WDL WDL

## 2024-09-08 ENCOUNTER — HEALTH MAINTENANCE LETTER (OUTPATIENT)
Age: 39
End: 2024-09-08